# Patient Record
(demographics unavailable — no encounter records)

---

## 2024-12-12 NOTE — DISCUSSION/SUMMARY
[FreeTextEntry1] : #1. Will schedule PFTs in near future to assess lung function when pt agreeable. #2. The patient does not appear to require chronic BD therapy at this time though uses Pulmicort at times. #3. Diet and exercise for weight loss. #4. Respiratory issues may be related to allergies. #5. Albuterol as needed; occasionally requires prolonged Pulmicort but only with allergies but rarely uses. #6. Zyrtec and Flonase for allergies and PNDS. #7. Replace equipment as needed; ordered 3/17/23 with AirTouch. #8. Pt had both Covid vaccines. S/p Covid infection. #9. F/u in 6 months with compliance and josselyn when pt agreeable for testing #10. Cardiology f/u for bradycardia but unclear if persistent but unlikely to be related to RICK or its therapy as was pt's concern  The patient expressed understanding and agreement with the above recommendations/plan and accepts responsibility to be compliant with recommended testing, therapies, and f/u visits. All relevant questions and concerns were addressed.

## 2024-12-12 NOTE — RESULTS/DATA
[TextEntry] : PSG from 12/9/15 revealed severe RICK with an AHI of 49.3.  CPAP titration study performed on 12/30/15 revealed an optimal pressure of 10 cm of water. The patient's overall compliance is 100% with a >4hr compliance of 100% on autoCPAP with a median and max pressure of 6.5-7.2 and 9.2-9.7 cm of water, respectively with a residual AHI of 0.9-2 which is normal.

## 2024-12-12 NOTE — HISTORY OF PRESENT ILLNESS
[Intermittent] : intermittent [Inhaled Short-Acting Beta-2 Agonists] : inhaled short-acting beta-2 agonists [None] : No associated symptoms are reported [Good Compliance] : good compliance with treatment [Good Tolerance] : good tolerance of treatment [Good Symptom Control] : good symptom control [Follow-Up - Routine Clinic] : a routine clinic follow-up of [Excess Weight] : excess weight [Currently Experiencing] : The patient is currently experiencing symptoms. [Dyspnea] : dyspnea [Low Calorie Diet] : low calorie diet [Fair Compliance] : fair compliance with treatment [Fair Tolerance] : fair tolerance of treatment [Poor Symptom Control] : poor symptom control [Sleep Apnea] : sleep apnea [High] : high [Low Calorie] : low calorie [Well Balanced Diet] : well balanced meals [Aerobic Conditioning] : aerobic conditioning [Swimming] : swimming [TextBox_4] : Never smoker. She feels better with Albuterol inhaler when needed or Zyrtec/Claritin for allergies. Uses Pulmicort during allergy season. S/p Covid infection 7/2024 with mild symptoms. Pt reports recent ECG which revealed sinus bradycardia with a HR of 52 though today pulse is in mid 60s. She exercises/swims regularly. She is otherwise asymptomatic and reports negative cardiac w/u. C/o PNDS and has been on Flonase prn. Pt has been trying to lose weight and lost a total of 50 lbs. [de-identified] : APAP

## 2024-12-12 NOTE — END OF VISIT
[Time Spent: ___ minutes] : I have spent [unfilled] minutes of time on the encounter which excludes teaching and separately reported services. [TextEntry] : Discussed with pt at length regarding RICK, obesity, allergies, bradycardia; reviewed prior sleep studies and compliance with pt.

## 2024-12-12 NOTE — REASON FOR VISIT
[Follow-Up] : a follow-up visit [Asthma] : asthma [Sleep Apnea] : sleep apnea [Obesity] : obesity [TextBox_44] : prior Covid infection

## 2024-12-12 NOTE — CONSULT LETTER
[Dear  ___] : Dear  [unfilled], [Consult Letter:] : I had the pleasure of evaluating your patient, [unfilled]. [Please see my note below.] : Please see my note below. [Consult Closing:] : Thank you very much for allowing me to participate in the care of this patient.  If you have any questions, please do not hesitate to contact me. [Sincerely,] : Sincerely, [FreeTextEntry3] : Sundar Rosas MD, FCCP, D. ABSM\par  Pulmonary and Sleep Medicine\par  [facility] [place]

## 2024-12-12 NOTE — REVIEW OF SYSTEMS
[Postnasal Drip] : postnasal drip [SOB on Exertion] : sob on exertion [Seasonal Allergies] : seasonal allergies [Back Pain] : back pain [Negative] : Endocrine [TextBox_69] : h/o IBS